# Patient Record
Sex: MALE | Race: WHITE | Employment: FULL TIME | URBAN - METROPOLITAN AREA
[De-identification: names, ages, dates, MRNs, and addresses within clinical notes are randomized per-mention and may not be internally consistent; named-entity substitution may affect disease eponyms.]

---

## 2019-06-30 ENCOUNTER — HOSPITAL ENCOUNTER (EMERGENCY)
Facility: CLINIC | Age: 34
Discharge: HOME | End: 2019-06-30
Attending: EMERGENCY MEDICINE | Admitting: EMERGENCY MEDICINE
Payer: OTHER MISCELLANEOUS

## 2019-06-30 ENCOUNTER — APPOINTMENT (OUTPATIENT)
Dept: CT IMAGING | Facility: CLINIC | Age: 34
End: 2019-06-30
Attending: EMERGENCY MEDICINE
Payer: OTHER MISCELLANEOUS

## 2019-06-30 VITALS
BODY MASS INDEX: 35.94 KG/M2 | RESPIRATION RATE: 18 BRPM | OXYGEN SATURATION: 95 % | TEMPERATURE: 98.8 F | HEIGHT: 74 IN | DIASTOLIC BLOOD PRESSURE: 102 MMHG | WEIGHT: 280 LBS | SYSTOLIC BLOOD PRESSURE: 150 MMHG
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
## 2019-06-30 DIAGNOSIS — S06.0X0A CONCUSSION WITHOUT LOSS OF CONSCIOUSNESS, INITIAL ENCOUNTER: ICD-10-CM

## 2019-06-30 DIAGNOSIS — R51.9 NONINTRACTABLE HEADACHE, UNSPECIFIED CHRONICITY PATTERN, UNSPECIFIED HEADACHE TYPE: ICD-10-CM

## 2019-06-30 PROCEDURE — 99284 EMERGENCY DEPT VISIT MOD MDM: CPT | Mod: 25

## 2019-06-30 PROCEDURE — 70450 CT HEAD/BRAIN W/O DYE: CPT

## 2019-06-30 PROCEDURE — 70486 CT MAXILLOFACIAL W/O DYE: CPT

## 2019-06-30 RX ORDER — IBUPROFEN 600 MG/1
600 TABLET, FILM COATED ORAL
Status: DISCONTINUED | OUTPATIENT
Start: 2019-06-30 | End: 2019-06-30 | Stop reason: HOSPADM

## 2019-06-30 RX ORDER — ACETAMINOPHEN 325 MG/1
650 TABLET ORAL EVERY 6 HOURS PRN
Qty: 20 TABLET | Refills: 0 | Status: SHIPPED | OUTPATIENT
Start: 2019-06-30 | End: 2021-05-21

## 2019-06-30 RX ORDER — IBUPROFEN 600 MG/1
600 TABLET, FILM COATED ORAL EVERY 6 HOURS PRN
Qty: 30 TABLET | Refills: 0 | Status: SHIPPED | OUTPATIENT
Start: 2019-06-30 | End: 2021-05-21

## 2019-06-30 ASSESSMENT — ENCOUNTER SYMPTOMS
NECK PAIN: 0
HEADACHES: 1
VOMITING: 0
EYE PAIN: 1
NAUSEA: 0

## 2019-06-30 ASSESSMENT — MIFFLIN-ST. JEOR: SCORE: 2289.82

## 2019-06-30 NOTE — ED AVS SNAPSHOT
[facility] Emergency Department  [address]  [facility] [address]  Phone:  [phone]  Fax:  [phone]                                    Deshawn Pena   MRN: [id number]    Department:  [facility] Emergency Department   Date of Visit:  6/30/2019           After Visit Summary Signature Page    I have received my discharge instructions, and my questions have been answered. I have discussed any challenges I see with this plan with the nurse or doctor.    ..........................................................................................................................................  Patient/Patient Representative Signature      ..........................................................................................................................................  Patient Representative Print Name and Relationship to Patient    ..................................................               ................................................  Date                                   Time    ..........................................................................................................................................  Reviewed by Signature/Title    ...................................................              ..............................................  Date                                               Time          22EPIC Rev 08/18

## 2019-06-30 NOTE — DISCHARGE INSTRUCTIONS
Discharge Instructions  Concussion    You were seen today for signs of a concussion.  The symptoms will vary, depending on the nature of your injury and your health. You may have: headache, confusion, nausea (feel sick to your stomach), vomiting (throwing up) and problems with memory, concentrating, or sleep. You may feel dizzy, irritable, and tired. Children and teens may need help from their parents, teachers, and coaches to watch for symptoms as they recover.    Generally, every Emergency Department visit should have a follow-up clinic visit with either a primary or a specialty clinic/provider. Please follow-up as instructed by your emergency provider today.     Return to the Emergency Department if:  Your headache gets worse or you start to have a really bad headache even with the recommended treatment plan.   You feel drowsier, have growing confusion, or slurred speech.   You keep repeating yourself.   You have strange behavior or are feeling more irritable.   You have a seizure.   You vomit (throw up) more than once.   You have trouble walking.   You have weakness or numbness.  Your neck pain gets worse.   You have a loss of consciousness.   You have blood for fluid coming from your ears or nose.   You have new symptoms or anything that worries you.     Home Care:  Get lots of rest and get enough sleep at night. Take daytime naps or rest if you feel tired.   Limit physical activity and  thinking  activities. These can make symptoms worse.   Physical activities include gym, sports, weight training, running, exercise, and heavy lifting.   Thinking activities include homework, class work, job-related work, and screen time (phone, computer, tablet, TV, and video games).   Stick to a healthy diet and drink lots of fluids. Avoid alcohol.  As symptoms improve, you may slowly return to your daily activities. If symptoms get worse or return, reduce your activity.   Know that it is normal to feel sad or frustrated when  you do not feel right and are less active.     Going Back to Work:  Your care team will tell you when you are ready to return to work.    Limit the amount of work you do soon after your injury. This may speed healing. Take breaks if your symptoms get worse. You should also reduce your physical activity as well as activities that require a lot of thinking until you see your doctor. You may need shorter work days and a lighter workload.  Avoid heavy lifting, working with machinery, driving and working at heights until your symptoms are gone or you are cleared by a provider.    Going Back to School:  If you are still having symptoms, you may need extra help at school.  Tell your teachers and school nurse about your injury and symptoms. Ask them to watch for problems with learning, memory, and concentrating. Symptoms may get worse when you do schoolwork, and you may become more irritable. You may need shorter school days, a reduced workload, and to postpone testing.  Do not drive or take gym class (physical activity) until cleared by a provider.    Returning to Sports:  Never return to play if you have any symptoms. A full recovery will reduce the chances of getting hurt again. Remember, it is better to miss one or two games than a whole season.  You should rest from all physical activity until you see your provider. Generally, if all symptoms have completely cleared, your provider can help guide you to slowly return to sports. If symptoms return or worsen, stop the activity and see your provider.  Important: If you are in an organized sport and under age 18, you will need written consent from a healthcare provider before you return to sports. Typically, this will be your primary care or sports medicine provider. Please make an appointment.    If you were given a prescription for medicine here today, be sure to read all of the information (including the package insert) that comes with your prescription.  This will  include important information about the medicine, its side effects, and any warnings that you need to know about.  The pharmacist who fills the prescription can provide more information and answer questions you may have about the medicine.  If you have questions or concerns that the pharmacist cannot address, please call or return to the Emergency Department.     Remember that you can always come back to the Emergency Department if you are not able to see your regular provider in the amount of time listed above, if you get any new symptoms, or if there is anything that worries you.
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
## 2019-07-01 ENCOUNTER — OFFICE VISIT (OUTPATIENT)
Dept: VASCULAR SURGERY | Facility: CLINIC | Age: 34
End: 2019-07-01
Payer: COMMERCIAL

## 2019-07-01 DIAGNOSIS — Z53.9 ERRONEOUS ENCOUNTER--DISREGARD: Primary | ICD-10-CM

## 2019-07-01 PROCEDURE — 99202 OFFICE O/P NEW SF 15 MIN: CPT | Performed by: SURGERY

## 2019-07-01 NOTE — PROGRESS NOTES
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  These have progressively increased and now involve both anterior medial and anterior lateral proximal one third of the calf with very large varicosities.  His job requires that he standing most of his shift and these have been progressively sore though he has had no history of phlebitis, DVT, bleeding, ulcerations.    Father had a history of varicose veins and thus he is worn medical grade knee-high compression stockings almost daily in particular when he is working for the last 8 years.  Even with this the varicosities developed on the right.  He has a few on the left but they are not bothersome or very large.    He did undergo a venous duplex ultrasound when living in [facility] a year ago.  They had recommended surgery.  Apparently the greater saphenous vein was not the cause of the varicosities.  However, before his insurance company approve the surgery he had already moved to the [facility] and thus comes to see us today.      PMH: No allergies.            Several medications as needed including Advil, Zantac, Zofran.            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    Social history:  with children.  Lives in [place].    [facility]: Father with varicose vein issues.    Exam: Very pleasant alert gentleman.  Normal affect.              Height 6 foot 3 inches.  Weight 280 pounds              Chest= clear              +3 dorsalis pedis pulses bilaterally.              U-type upside down pattern varicosities on the medial and lateral proximal right calf underlying  the patella.  Up to 10 mm in diameter.  Very little if any distal edema.  Normal skin.  Normal sensation.  Very few visible varicosities in the left leg.      Impression: Significant very dilated symptomatic right calf varicose veins.  I am somewhat surprised that the outside institution ultrasound revealed a normal greater saphenous vein as I would expect this to be the etiology.  We do need to repeat the duplex ultrasound since it is over 6 months old and we will schedule him for this.  He has been wearing compression stockings for up to 8 years and will continue doing so.  We discussed the various surgical birth procedures depending on ultrasound findings.  If we need to treat the greater saphenous vein complete closure from the groin to the ankle would be performed and he is aware he may have some temporary permanent numbness of the greater saphenous nerve distribution.  We also discussed the surgical procedure with either the VNUS Closure or cosmetic stab phlebectomies or combination thereof.  Discussed the compression protocol and follow-up duplex ultrasound and restrictions returning to work.    Right leg VCSS=7   CEAP:C2      Donovan Yip MD

## 2019-08-05 ENCOUNTER — OFFICE VISIT (OUTPATIENT)
Dept: VASCULAR SURGERY | Facility: CLINIC | Age: 34
End: 2019-08-05
Payer: COMMERCIAL

## 2019-08-05 DIAGNOSIS — Z53.9 ERRONEOUS ENCOUNTER--DISREGARD: Primary | ICD-10-CM

## 2019-08-05 PROCEDURE — 99213 OFFICE O/P EST LOW 20 MIN: CPT | Performed by: SURGERY

## 2019-08-20 ENCOUNTER — APPOINTMENT (OUTPATIENT)
Dept: VASCULAR SURGERY | Facility: CLINIC | Age: 34
End: 2019-08-20
Payer: COMMERCIAL

## 2019-08-20 PROCEDURE — 99207 ZZC VEINSOLUTIONS NO CHARGE VISIT: CPT | Performed by: SURGERY

## 2019-09-04 ENCOUNTER — OFFICE VISIT (OUTPATIENT)
Dept: VASCULAR SURGERY | Facility: CLINIC | Age: 34
End: 2019-09-04

## 2019-09-04 ENCOUNTER — APPOINTMENT (OUTPATIENT)
Dept: VASCULAR SURGERY | Facility: CLINIC | Age: 34
End: 2019-09-04
Payer: COMMERCIAL

## 2019-09-04 DIAGNOSIS — Z53.9 ERRONEOUS ENCOUNTER--DISREGARD: Primary | ICD-10-CM

## 2019-09-04 PROCEDURE — S9999 SALES TAX: HCPCS | Performed by: SURGERY

## 2019-09-04 PROCEDURE — 37765 STAB PHLEB VEINS XTR 10-20: CPT | Performed by: SURGERY

## 2019-09-04 PROCEDURE — 36475 ENDOVENOUS RF 1ST VEIN: CPT | Mod: RT | Performed by: SURGERY

## 2019-09-04 PROCEDURE — 37799 UNLISTED PX VASCULAR SURGERY: CPT | Performed by: SURGERY

## 2019-09-04 NOTE — PROGRESS NOTES
[facility] Operative Report    Preoperative diagnosis:    1.  Incompetent right greater saphenous vein  2.  Painful right calf varicose veins.    Post operative diagnosis:  Same    Procedure:  1.  Radiofrequency ablation entire right greater saphenous vein  2.  Cosmetic stab phlebectomy right calf varicosities.    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  He is found to have incompetence of the greater saphenous vein from the saphenofemoral junction on the mid calf with a diameter of almost 10 mm throughout the thigh.  Deep system is normal.  He is tried compression but has ongoing symptoms.  Darling to benefit from surgical treatment.  With him standing in preinduction with his wife present we marked the surface dilated varicosities.  Risk benefits were reviewed including the possibility of temporary or permanent numbness the greater saphenous nerve.    Procedure: Brought to our procedure room.  The entire right leg and groin were prepped and draped.  Patient is 6 foot 3 inches tall.  He was placed in reverse Trendelenburg and we identified the greater saphenous vein from the junction down to the ankle as a single channel along with a large dilated varicosities in the proximal calf with ultrasound.  Timeout was called and sites were identified.    VNUS: Reaccessed the ankle greater saphenous vein with ultrasound guidance.  This was dilated with 7 Maori sheath.  Closure fast catheter was selected and passed through the sheath to the saphenofemoral junction with no difficulty.  Tip was placed 1.89 cm from the junction.  Placed in Trendelenburg where the position of  the tip was documented.  Under ultrasound guidance the tumescent solution was injected circumferentially around the catheter from the proximal calf to the groin and then from the ankle to the knee.  At least 1 cm of tumescence solution was noted between the catheter and the dermis.  We then began our treatment sessions.  This was done for 20 sec intervals at 120  C.  The first segment was treated with 3 sessions.  The rest of the segments to the proximal calf were treated with 2 segments to the large diameter with good visualization effect with ultrasound compression and no discomfort.  The rest of the calf segments were treated with a single session.  Sheath and catheter removed and pressure was held.  5-0 chromic was placed at the ankle site.    Stab phlebectomy: We then anesthetized the previously marked sites on his calf for the surface varicosities.  With a micro-ophthalmic blade we made 36 stepladder incisions.  With a vein hook remove these very enlarged varicose vein branches their entirety with minimal blood loss.    Vaseline ointment was placed over all sites followed by ABD pads cast rolls and Ace bandage from the toes to the thigh.    CECIL Godinez monitored blood pressure, pulse and pulse oximetry continuously throughout the procedure.  This was done under my direct supervision and stable during the entire procedure.    Patient recovered and discharged home with his wife with postoperative instructions and follow-up.    VNUS Procedure:    Pharmacologic agents:   Local anesthesia:   10 mL Lidocaine 1% with Epinephrine + 1 mL Sodium Bicarbonate 8.4%    Total injected volume: 4 mL    Tumescent Anesthesia: 500 ml bag 0.9% Sodium Chloride +60 ml 1% lidocaine with epi 1:100,000 +12 ml 8.4% Sodium Bicarbonate (total volume: 572ml per bag)  Total injected volume: 522 mL    Use of ultrasound guidance:  Yes  Start time: 0802 hrs.  End time: 0909 hrs.  Catheter insertion site: Right ankle greater  saphenous vein  Length of vein treated: 89.5 cm  Treatment with 24 RF cycles for 8 minutes and 0 seconds     36 stab phlebectomy sites  EBL= 20 mL      Donovan Yip MD   Dictated 9/4/2019

## 2019-09-06 ENCOUNTER — APPOINTMENT (OUTPATIENT)
Dept: VASCULAR SURGERY | Facility: CLINIC | Age: 34
End: 2019-09-06
Payer: COMMERCIAL

## 2019-09-06 PROCEDURE — 93971 EXTREMITY STUDY: CPT | Performed by: SURGERY

## 2019-09-06 PROCEDURE — 99207 ZZC VEINSOLUTIONS NO CHARGE VISIT: CPT | Performed by: SURGERY

## 2019-10-14 ENCOUNTER — OFFICE VISIT (OUTPATIENT)
Dept: VASCULAR SURGERY | Facility: CLINIC | Age: 34
End: 2019-10-14
Payer: COMMERCIAL

## 2019-10-14 DIAGNOSIS — I83.812 VARICOSE VEINS OF LEFT LOWER EXTREMITY WITH PAIN: Primary | ICD-10-CM

## 2019-10-14 PROCEDURE — 99207 ZZC VEINSOLUTIONS POST OPERATIVE VISIT: CPT | Performed by: SURGERY

## 2019-10-14 NOTE — PROGRESS NOTES
[facility]: Jess Pena returns for 6-week follow-up.  He underwent ablation the entire left greater saphenous vein along with right calf stab phlebectomies on 9/4/2019.  Follow-up 72-hour duplex revealed appropriate occlusion of the greater saphenous vein 8.3 mm from the junction.    He did have some mild numbness along the greater saphenous distribution from the mid calf initially and this has improved to involving only the ankle distally.  He said no discomfort otherwise in his leg pain is markedly improved from preoperatively.    At work he typically wears knee-high compression stocking on both legs even after the surgery.    Exam: Doing very well.              No palpable cords.              No distal edema.              No visible remaining varicosities              Stab phlebectomy sites are healing very nicely              Very mild ankle numbness.    No significant left leg varicosities.  No swelling.    IImpression: Doing very well following venous surgery in the right leg.  Discussed the numbness situation with him again today and fortunately is improving.  He still wears the knee-high compression stockings.  I did give him information on knee-high athletic CEP stockings that he may want to use instead of a standard compression stocking.  We will see him in 1 year for a follow-up duplex ultrasound.    Donovan Yip MD     Dictated 10/14/2019

## 2020-03-25 DIAGNOSIS — I83.811 VARICOSE VEINS OF RIGHT LOWER EXTREMITY WITH PAIN: Primary | ICD-10-CM

## 2020-08-26 ENCOUNTER — HOSPITAL ENCOUNTER (OUTPATIENT)
Dept: LAB | Facility: CLINIC | Age: 35
Discharge: HOME | End: 2020-08-26
Attending: EMERGENCY MEDICINE | Admitting: EMERGENCY MEDICINE
Payer: COMMERCIAL

## 2020-08-26 ENCOUNTER — NURSE TRIAGE (OUTPATIENT)
Dept: NURSING | Facility: CLINIC | Age: 35
End: 2020-08-26

## 2020-08-26 DIAGNOSIS — Z20.822 EXPOSURE TO COVID-19 VIRUS: Primary | ICD-10-CM

## 2020-08-26 DIAGNOSIS — Z20.822 EXPOSURE TO COVID-19 VIRUS: ICD-10-CM

## 2020-08-26 PROCEDURE — 36415 COLL VENOUS BLD VENIPUNCTURE: CPT | Performed by: EMERGENCY MEDICINE

## 2020-08-26 PROCEDURE — 86769 SARS-COV-2 COVID-19 ANTIBODY: CPT | Performed by: EMERGENCY MEDICINE

## 2020-08-26 NOTE — TELEPHONE ENCOUNTER
"Patient is calling requesting COVID serologic antibody testing.  NOTE: Serologic testing is a blood test for 'antibodies' which are made at 10-14 days after you have had symptoms of COVID or were exposed and had an asymptomatic infection.  This does NOT test you for 'active' infection or tell you if you are contagious.    Are you a healthcare worker? yes  Do you currently have a cough, fever, body aches, shortness of breath, or difficulty breathing?  No  Did you previously have cough, fever, body aches, shortness of breath, or difficulty breathing that have now resolved? No previous covid symptoms.   Have you been exposed to (or come into close contact with) someone who tested POSITIVE for COVID-19 or someone who had a possible case of COVID-19?  Possible exposure 48 days ago.  Possible exposure > 14 days ago.  Lab order placed per SARS-CoV-2 Serology test Standing Order using indication \"Possible exposure >14 days ago\" and diagnosis code  \"Exposure to COVID-19 Virus\" (Z20.828)      The patient was informed: \"Testing is limited each day and it may take time for testing to be available to everyone who has called. You will receive a call within 48-72 hours to schedule the serology testing. Please confirm the best number to reach you is [phone]. If you have any questions about scheduling, call [phone].\"     Radha Haywood RN on 8/26/2020 at 10:31 AM    "

## 2020-08-27 LAB
COVID-19 SPIKE RBD ABY TITER: NORMAL
COVID-19 SPIKE RBD ABY: NEGATIVE

## 2020-09-01 PROBLEM — Z76.89 HEALTH CARE HOME: Status: ACTIVE | Noted: 2020-09-01

## 2020-09-01 PROBLEM — Z71.89 ACP (ADVANCE CARE PLANNING): Status: ACTIVE | Noted: 2020-09-01

## 2020-09-02 ENCOUNTER — OFFICE VISIT (OUTPATIENT)
Dept: FAMILY MEDICINE | Facility: CLINIC | Age: 35
End: 2020-09-02

## 2020-09-02 VITALS
RESPIRATION RATE: 20 BRPM | SYSTOLIC BLOOD PRESSURE: 118 MMHG | WEIGHT: 284 LBS | HEIGHT: 75 IN | OXYGEN SATURATION: 97 % | BODY MASS INDEX: 35.31 KG/M2 | HEART RATE: 73 BPM | TEMPERATURE: 98.1 F | DIASTOLIC BLOOD PRESSURE: 94 MMHG

## 2020-09-02 DIAGNOSIS — R11.2 NAUSEA AND VOMITING, INTRACTABILITY OF VOMITING NOT SPECIFIED, UNSPECIFIED VOMITING TYPE: ICD-10-CM

## 2020-09-02 DIAGNOSIS — Z00.00 ROUTINE HISTORY AND PHYSICAL EXAMINATION OF ADULT: Primary | ICD-10-CM

## 2020-09-02 DIAGNOSIS — Z71.89 ACP (ADVANCE CARE PLANNING): ICD-10-CM

## 2020-09-02 DIAGNOSIS — R21 RASH AND NONSPECIFIC SKIN ERUPTION: ICD-10-CM

## 2020-09-02 DIAGNOSIS — Z76.89 HEALTH CARE HOME: ICD-10-CM

## 2020-09-02 DIAGNOSIS — G47.26 SHIFT WORK SLEEP DISORDER: ICD-10-CM

## 2020-09-02 LAB
BUN SERPL-MCNC: 14 MG/DL (ref 7–25)
BUN/CREATININE RATIO: 15.9 (ref 6–22)
CALCIUM SERPL-MCNC: 9.3 MG/DL (ref 8.6–10.3)
CHLORIDE SERPLBLD-SCNC: 105.3 MMOL/L (ref 98–110)
CHOLEST SERPL-MCNC: 157 MG/DL (ref 0–199)
CHOLEST/HDLC SERPL: 4 {RATIO} (ref 0–5)
CO2 SERPL-SCNC: 31.1 MMOL/L (ref 20–32)
CREAT SERPL-MCNC: 0.88 MG/DL (ref 0.7–1.18)
GLUCOSE SERPL-MCNC: 92 MG/DL (ref 60–99)
HDLC SERPL-MCNC: 39 MG/DL (ref 40–150)
HEMOGLOBIN: 14.3 G/DL (ref 13.3–17.7)
LDLC SERPL CALC-MCNC: 99 MG/DL (ref 0–130)
POTASSIUM SERPL-SCNC: 4.39 MMOL/L (ref 3.5–5.3)
SODIUM SERPL-SCNC: 142.2 MMOL/L (ref 135–146)
TRIGL SERPL-MCNC: 97 MG/DL (ref 0–149)

## 2020-09-02 PROCEDURE — 36415 COLL VENOUS BLD VENIPUNCTURE: CPT | Performed by: FAMILY MEDICINE

## 2020-09-02 PROCEDURE — 80048 BASIC METABOLIC PNL TOTAL CA: CPT | Performed by: FAMILY MEDICINE

## 2020-09-02 PROCEDURE — 85018 HEMOGLOBIN: CPT | Performed by: FAMILY MEDICINE

## 2020-09-02 PROCEDURE — 99395 PREV VISIT EST AGE 18-39: CPT | Performed by: FAMILY MEDICINE

## 2020-09-02 PROCEDURE — 80061 LIPID PANEL: CPT | Performed by: FAMILY MEDICINE

## 2020-09-02 RX ORDER — ONDANSETRON 4 MG/1
TABLET, ORALLY DISINTEGRATING ORAL
COMMUNITY
Start: 2017-09-27 | End: 2020-09-02

## 2020-09-02 RX ORDER — PREDNISONE 20 MG/1
TABLET ORAL
Qty: 10 TABLET | Refills: 0 | Status: SHIPPED | OUTPATIENT
Start: 2020-09-02 | End: 2021-05-21

## 2020-09-02 RX ORDER — ARMODAFINIL 250 MG/1
250 TABLET ORAL
COMMUNITY
Start: 2019-10-24 | End: 2020-09-02

## 2020-09-02 RX ORDER — ONDANSETRON 4 MG/1
4 TABLET, ORALLY DISINTEGRATING ORAL EVERY 6 HOURS PRN
Qty: 60 TABLET | Refills: 1 | Status: SHIPPED | OUTPATIENT
Start: 2020-09-02 | End: 2021-10-29

## 2020-09-02 RX ORDER — ARMODAFINIL 250 MG/1
250 TABLET ORAL
Qty: 30 TABLET | Refills: 1 | Status: SHIPPED | OUTPATIENT
Start: 2020-09-03 | End: 2021-10-29

## 2020-09-02 SDOH — HEALTH STABILITY: MENTAL HEALTH: HOW MANY STANDARD DRINKS CONTAINING ALCOHOL DO YOU HAVE ON A TYPICAL DAY?: 1 OR 2

## 2020-09-02 SDOH — HEALTH STABILITY: MENTAL HEALTH: HOW OFTEN DO YOU HAVE 6 OR MORE DRINKS ON ONE OCCASION?: NEVER

## 2020-09-02 SDOH — HEALTH STABILITY: MENTAL HEALTH: HOW OFTEN DO YOU HAVE A DRINK CONTAINING ALCOHOL?: MONTHLY OR LESS

## 2020-09-02 ASSESSMENT — ANXIETY QUESTIONNAIRES
GAD7 TOTAL SCORE: 0
7. FEELING AFRAID AS IF SOMETHING AWFUL MIGHT HAPPEN: NOT AT ALL
3. WORRYING TOO MUCH ABOUT DIFFERENT THINGS: NOT AT ALL
1. FEELING NERVOUS, ANXIOUS, OR ON EDGE: NOT AT ALL
2. NOT BEING ABLE TO STOP OR CONTROL WORRYING: NOT AT ALL
6. BECOMING EASILY ANNOYED OR IRRITABLE: NOT AT ALL
5. BEING SO RESTLESS THAT IT IS HARD TO SIT STILL: NOT AT ALL

## 2020-09-02 ASSESSMENT — MIFFLIN-ST. JEOR: SCORE: 2310.91

## 2020-09-02 ASSESSMENT — PATIENT HEALTH QUESTIONNAIRE - PHQ9
SUM OF ALL RESPONSES TO PHQ QUESTIONS 1-9: 0
5. POOR APPETITE OR OVEREATING: NOT AT ALL

## 2020-09-02 NOTE — PROGRESS NOTES
SUBJECTIVE:    CC: Deshawn Pena is a 35 year old male who presents for physical    HPI: over all doing well  Has rash on leg due to bug bites    HISTORIES:    PROBLEM LIST:                   Patient Active Problem List   Diagnosis     ACP (advance care planning)     Health Care Home       PAST MEDICAL HISTORY:                No past medical history on file.    PAST SURGICAL HISTORY:                No past surgical history on file.    CURRENT MEDICATIONS:                  Current Outpatient Medications   Medication Sig Dispense Refill     acetaminophen (TYLENOL) 325 MG tablet Take 2 tablets (650 mg) by mouth every 6 hours as needed for mild pain 20 tablet 0     [START ON 9/3/2020] armodafinil (NUVIGIL) 250 MG TABS tablet Take 1 tablet (250 mg) by mouth twice a week 30 tablet 1     ibuprofen (ADVIL/MOTRIN) 600 MG tablet Take 1 tablet (600 mg) by mouth every 6 hours as needed for moderate pain 30 tablet 0     ondansetron (ZOFRAN-ODT) 4 MG ODT tab Take 1 tablet (4 mg) by mouth every 6 hours as needed for nausea 60 tablet 1     predniSONE (DELTASONE) 20 MG tablet 2 daily for 3 days then 1 daily for 4 days 10 tablet 0       ALLERGIES:                No Known Allergies    SOCIAL HISTORY:                  Social History     Socioeconomic History     Marital status:      Spouse name: Not on file     Number of children: Not on file     Years of education: Not on file     Highest education level: Not on file   Occupational History     Not on file   Social Needs     Financial resource strain: Not on file     Food insecurity     Worry: Not on file     Inability: Not on file     Transportation needs     Medical: Not on file     Non-medical: Not on file   Tobacco Use     Smoking status: Never Smoker     Smokeless tobacco: Never Used   Substance and Sexual Activity     Alcohol use: Yes     Frequency: Monthly or less     Drinks per session: 1 or 2     Binge frequency: Never     Drug use: Not on file     Sexual activity:  "Not on file   Lifestyle     Physical activity     Days per week: Not on file     Minutes per session: Not on file     Stress: Not on file   Relationships     Social connections     Talks on phone: Not on file     Gets together: Not on file     Attends Orthodox service: Not on file     Active member of club or organization: Not on file     Attends meetings of clubs or organizations: Not on file     Relationship status: Not on file     Intimate partner violence     Fear of current or ex partner: Not on file     Emotionally abused: Not on file     Physically abused: Not on file     Forced sexual activity: Not on file   Other Topics Concern     Not on file   Social History Narrative     Not on file       FAMILY HISTORY:                   Family History   Problem Relation Age of Onset     Hypertension Mother      Diabetes Type 2  Father      Hypertension Father      Heart Disease Paternal Grandmother        HEALTH MAINTENANCE:                    REVIEW OF OUTSIDE RECORDS: NO    REVIEW OF SYSTEMS:  CONSTITUTIONAL: NEGATIVE for fever, chills  EYES: NEGATIVE for vision changes   RESP: NEGATIVE for significant cough or SOB  CV: NEGATIVE for chest pain, palpitations   GI: NEGATIVE for nausea, abdominal pain, heartburn, or change in bowel habits  : NEGATIVE for frequency, dysuria, or hematuria  MUSCULOSKELETAL: NEGATIVE for significant arthralgias or myalgia  NEURO: NEGATIVE for weakness, dizziness or paresthesias or headache  ENDOCRINE: NEGATIVE for temperature intolerance, skin/hair changes  HEME: NEGATIVE for bleeding problems  PSYCHIATRIC: NEGATIVE for changes in mood or affect    EXAM:    BP (!) 118/94 (BP Location: Right arm, Patient Position: Sitting, Cuff Size: Adult Large)   Pulse 73   Temp 98.1  F (36.7  C) (Oral)   Resp 20   Ht 1.892 m (6' 2.5\")   Wt 128.8 kg (284 lb)   SpO2 97%   BMI 35.98 kg/m    GENERAL APPEARANCE: healthy, alert and no distress   EXAM:  GENERAL APPEARANCE: healthy, alert and no " distress  EYES: EOMI,  PERRL  HENT: ear canals and TM's normal and nose and mouth without ulcers or lesions  RESP: lungs clear to auscultation - no rales, rhonchi or wheezes  CV: regular rates and rhythm, normal S1 S2, no S3 or S4 and no murmur, click or rub -  ABDOMEN:  soft, nontender, no HSM or masses and bowel sounds normal  GU_male: testicles normal without atrophy or masses and no hernias  MS: extremities normal- no gross deformities noted, no evidence of inflammation in joints, FROM in all extremities.  SKIN: excoriation - upper legs  PSYCH: mentation appears normal and affect normal/bright  LYMPHATICS: No axillary, cervical, inguinal, or supraclavicular nodes         ASSESSMENT/PLAN  (Z00.00) Routine history and physical examination of adult  (primary encounter diagnosis)  Comment: weight loss and exercise  Plan: HEMOGLOBIN, Basic Metabolic Panel (BFP), Lipid         Panel (BFP)            (Z71.89) ACP (advance care planning)  Comment:   Plan:     (Z76.89) Health Care Home  Comment:   Plan:     (R11.2) Nausea and vomiting, intractability of vomiting not specified, unspecified vomiting type  Comment:   Plan: ondansetron (ZOFRAN-ODT) 4 MG ODT tab            (G47.26) Shift work sleep disorder  Comment:   Plan: armodafinil (NUVIGIL) 250 MG TABS tablet            (R21) Rash and nonspecific skin eruption  Comment:   Plan: predniSONE (DELTASONE) 20 MG tablet                I have discussed with patient the risks, benefits, medications, treatment options and modalities.   I have instructed the patient to call or schedule a follow-up appointment if any problems or failure to improve.

## 2020-09-02 NOTE — NURSING NOTE
Chief Complaint   Patient presents with     Physical     annual, fasting     New Patient     new patient to this clinic     Pre-visit Screening:  Immunizations:  Unknown-do not have records in system but patient states that he had his last 6 years ago   Colonoscopy:  NA  Mammogram: NA  Asthma Action Test/Plan:  NA  PHQ9:  Given today-new pt   GAD7:  Given today-new pt   Questioned patient about current smoking habits Pt. has never smoked.  Ok to leave detailed message on voice mail for today's visit only Yes, phone # [phone]

## 2020-09-02 NOTE — LETTER
[facility]  [address] [place]  [address]  [facility] [address]  [phone]      September 2, 2020      Deshawn Pena  [address]  [facility] [address]      EMERGENCY CARE PLAN  Presenting Problem Treatment Plan   Questions or concerns during clinic hours I will call the clinic directly:    [facility]  [address] , [address]  [place] [address]  [phone]   Questions or concerns outside clinic hours  I will call the 24 hour line at [phone]   Patient needs to schedule an appointment  I will call the  scheduling line at [phone]   Same day treatment   I will call the clinic first, then  urgent care and/or  express care if needed   Clinic Care Coordinators Liliana Garnett RN:  [phone]  [facility] Clinical Support Staff: [phone]    Crisis Services:  Behavioral or Mental Health [facility] ([facility]al [facility] Providers)   [phone]   Emergency treatment--Immediately CALL [phone]

## 2020-09-03 ASSESSMENT — ANXIETY QUESTIONNAIRES: GAD7 TOTAL SCORE: 0

## 2020-10-14 ENCOUNTER — TRANSFERRED RECORDS (OUTPATIENT)
Dept: FAMILY MEDICINE | Facility: CLINIC | Age: 35
End: 2020-10-14

## 2021-01-03 ENCOUNTER — HEALTH MAINTENANCE LETTER (OUTPATIENT)
Age: 36
End: 2021-01-03

## 2021-05-21 ENCOUNTER — OFFICE VISIT (OUTPATIENT)
Dept: FAMILY MEDICINE | Facility: CLINIC | Age: 36
End: 2021-05-21

## 2021-05-21 VITALS
DIASTOLIC BLOOD PRESSURE: 80 MMHG | TEMPERATURE: 97.8 F | HEART RATE: 70 BPM | WEIGHT: 293.4 LBS | HEIGHT: 75 IN | SYSTOLIC BLOOD PRESSURE: 122 MMHG | OXYGEN SATURATION: 97 % | BODY MASS INDEX: 36.48 KG/M2

## 2021-05-21 DIAGNOSIS — L03.116 CELLULITIS OF LEFT LOWER EXTREMITY: Primary | ICD-10-CM

## 2021-05-21 PROCEDURE — 99214 OFFICE O/P EST MOD 30 MIN: CPT | Performed by: FAMILY MEDICINE

## 2021-05-21 RX ORDER — CEPHALEXIN 500 MG/1
500 CAPSULE ORAL 4 TIMES DAILY
Qty: 40 CAPSULE | Refills: 0 | Status: SHIPPED | OUTPATIENT
Start: 2021-05-21 | End: 2021-06-02

## 2021-05-21 ASSESSMENT — MIFFLIN-ST. JEOR: SCORE: 2353.54

## 2021-05-21 NOTE — PATIENT INSTRUCTIONS
"Assessment & Plan   Problem List Items Addressed This Visit     None      Visit Diagnoses     Cellulitis of left lower extremity    -  Primary    Relevant Medications    cephALEXin (KEFLEX) 500 MG capsule         1. Cellulitis of left lower extremity  Treat with keflex, watch closely, followup if changes or worsening.           BMI:   Estimated body mass index is 37.17 kg/m  as calculated from the following:    Height as of this encounter: 1.892 m (6' 2.5\").    Weight as of this encounter: 133.1 kg (293 lb 6.4 oz).   Weight management plan: Discussed healthy diet and exercise guidelines      FUTURE APPOINTMENTS:       - Follow-up visit if any changes or worsening.    No follow-ups on file.    Lisa Rangel MD  [facility]    "

## 2021-05-21 NOTE — NURSING NOTE
Michele is here for 3-4 days ago, left leg, red warm to touch, blisters, fluid when they pop, thinks celluilitis.    Pre-Visit Screening:  Immunizations:UTD  Colonoscopy:NA  Mammogram:Na  Asthma Action Test/Plan:Na  PHQ9:Na  GAD7:Na  Questioned patient about current smoking habits Pt.never smoker  OK to leave a detailed message on voice mail for today's visit yes, phone # [phone]

## 2021-05-21 NOTE — PROGRESS NOTES
"Assessment & Plan   Problem List Items Addressed This Visit     None      Visit Diagnoses     Cellulitis of left lower extremity    -  Primary    Relevant Medications    cephALEXin (KEFLEX) 500 MG capsule         1. Cellulitis of left lower extremity  Treat with keflex, watch closely, followup if changes or worsening.           BMI:   Estimated body mass index is 37.17 kg/m  as calculated from the following:    Height as of this encounter: 1.892 m (6' 2.5\").    Weight as of this encounter: 133.1 kg (293 lb 6.4 oz).   Weight management plan: Discussed healthy diet and exercise guidelines      FUTURE APPOINTMENTS:       - Follow-up visit if any changes or worsening.    No follow-ups on file.    Lisa Rangel MD  [facility]    Subjective     Nursing Notes:   Irene De Luna, [facility]  5/21/2021  7:51 AM  Signed  Micheel is here for 3-4 days ago, left leg, red warm to touch, blisters, fluid when they pop, thinks celluilitis.    Pre-Visit Screening:  Immunizations:UTD  Colonoscopy:NA  Mammogram:Na  Asthma Action Test/Plan:Na  PHQ9:Na  GAD7:Na  Questioned patient about current smoking habits Pt.never smoker  OK to leave a detailed message on voice mail for today's visit yes, phone # [phone]           Deshawn Pena is a 35 year old male who presents to clinic today for the following health issues  HPI     Blisters and redness, no fever, left thigh for 3-4 days, worsening. Concern for cellulitis, has had this a year ago.    Review of Systems   Constitutional, HEENT, cardiovascular, pulmonary, gi and gu systems are negative, except as otherwise noted.      Objective    /80 (BP Location: Right arm, Patient Position: Sitting, Cuff Size: Adult Large)   Pulse 70   Temp 97.8  F (36.6  C) (Oral)   Ht 1.892 m (6' 2.5\")   Wt 133.1 kg (293 lb 6.4 oz)   SpO2 97%   BMI 37.17 kg/m    Body mass index is 37.17 kg/m .  Physical Exam   GENERAL: healthy, alert and no distress  MS: no gross " musculoskeletal defects noted, no edema  NEURO: Normal strength and tone, mentation intact and speech normal  PSYCH: mentation appears normal, affect normal/bright  Left anterior thigh few cm area of redness, slight increased warmth, central lesion with no vesicle, slightly raised redness/swelling

## 2021-06-01 ENCOUNTER — MYC MEDICAL ADVICE (OUTPATIENT)
Dept: FAMILY MEDICINE | Facility: CLINIC | Age: 36
End: 2021-06-01

## 2021-06-01 DIAGNOSIS — L03.116 CELLULITIS OF LEFT LOWER EXTREMITY: ICD-10-CM

## 2021-06-02 RX ORDER — CEPHALEXIN 500 MG/1
500 CAPSULE ORAL 4 TIMES DAILY
Qty: 40 CAPSULE | Refills: 0 | Status: SHIPPED | OUTPATIENT
Start: 2021-06-02 | End: 2021-06-05

## 2021-08-04 NOTE — PROGRESS NOTES
7:52 AM  Patient comfortable.  US Renal Complete (Comp Urinary System)   Final Result   IMPRESSION:      Unremarkable exam.                 Will treat pyuria with keflex for 10 days.     Xander Ortega MD  08/04/21 0891     [facility]    Deshawn Pena returns today to discuss the right leg venous duplex report that was just completed.  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  He has been wearing a knee-high graduated compression stocking for almost 8 years which only gives him temporary relief.  This affects his normal activities at home and even more at work where his job requires that he standing most of his shift.      Exam: Unchanged from previously.  Very large varicosities primarily in the proximal right medial anterior calf.  No significant distal edema.  No ulcerations.  Good pulses.      I reviewed the venous duplex ultrasound with the patient today.  The deep system is competent except for the common femoral vein which is very common with incompetent greater saphenous vein but the rest of the deep veins are patent with no DVT.  Greater saphenous vein is very dilated incompetent from the saphenofemoral junction down to the ankle.  This measures 9.6 mm in the thigh with a reflux time of 5262 ms.  Measured 7.6 mm at the knee with a reflux time of 9684 ms.  In the mid calf diameter 6.9 again with a significant reflux time of 6747 ms.  Most of the 4 large dominant branches that can measure between 5.9 and 10.1 mm come off the immediate below-knee segment.  Lesser saphenous vein does not communicate with the deep system and is competent though there is some venous branches coming off distally at the ankle.  Accessory saphenous vein is normal.  No incompetent perforators.      Impression: Very symptomatic painful right leg varicose veins continue to be a problem despite aggressive use of compression.  I would recommend that we proceed with complete ablation the entire right greater saphenous vein from the saphenofemoral junction of the ankle along with stab phlebectomies of the very large calf and ankle varicosities.  He  is aware there is a chance of numbness either temporary or even permanent along the greater saphenous nerve distribution explained why we need to treat the calf segment down to the ankle and he understands this.    Procedure would be performed in our office in her late oral sedation with Ativan and clonidine and a tumescent anesthetic.  Compression will be applied postprocedure with a follow-up duplex in 72 hours.  Following this you were thigh-high compression stocking for approximately 10 days.  May gradually increase his work activities.  Very likely long-term calf high graduated compression stocking would be indicated due to his prior venous issues standing requirements.    We spent 20 minutes in the office today with over 50% in counseling.  He and his wife are having  their third child in early fall.  He will try to schedule this before or after the delivery.      Donovan Yip MD     Dictated 8/5/2019

## 2021-09-30 ENCOUNTER — VIRTUAL VISIT (OUTPATIENT)
Dept: UROLOGY | Facility: CLINIC | Age: 36
End: 2021-09-30
Payer: COMMERCIAL

## 2021-09-30 DIAGNOSIS — Z30.09 VASECTOMY EVALUATION: Primary | ICD-10-CM

## 2021-09-30 PROCEDURE — 99203 OFFICE O/P NEW LOW 30 MIN: CPT | Mod: 95 | Performed by: UROLOGY

## 2021-09-30 NOTE — PATIENT INSTRUCTIONS
[facility]  [address]  [place] [address]     Your Vasectomy is scheduled 11/9/2021 ARRIVE 9:35 for 9:45 procedure.  Please call [phone] if you need to reschedule this appointment or if you have any questions.     Preparation for Vasectomy:  Shave the hair away from the base of your penis and around your testicles.  Wear snug underwear the day of the vasectomy to support your testicles.  Do not take aspirin, ibuprofen, advil, motrin, aleve products one week prior to your vasectomy.        General Vasectomy Information    Vasectomy is a surgery.  If it is successful, it will be impossible for you to ever father children.  The following information regards the male sterilization done by an operation called a vasectomy.  This is done in the physician's office.    The operation done to sterilize the male is easier, safer and much less expensive than the operation done to sterilize the woman.    Sterilization should be considered permanent.  For most males, once the operation is done, it can never me undone.  There are attempts made occasionally to reconnect the tubes that have been cut during the procedure, but this is very difficult and expensive and works only about 50-70% of the time.  In order for any of the physicians in our clinic to do a vasectomy, we require that you consider this a permanent form a sterilization.    A vasectomy can be done at any time, but it is best to think about having it done when you can take at least one day off from work and any excess activities.    Your decision to have a vasectomy should only be made with the following facts clear in mind.    1. First, a vasectomy is only one of several means of birth control.  Many form of temporary contraception are available.  If you have any questions about other methods, please discuss this with your physician.    2. A vasectomy may be unsuccessful in approximately one out of 1000 couples per year.  This occurs when the  tubes which are cut during the procedure reconnect themselves.  Sterility cannot be guaranteed.    3. You should be aware that it is the current belief of the medical profession that a vasectomy procedure does not alter a male physically, physiologically or sexually.  Because each person is a unique individual, there is always the possibility of an adverse phychiatric reaction.  This can be best avoided by being very comfortable in your own mind that you want to have this done, and that you do not want to father any children in the future.  If this is not clear in your mind, this should be further discussed with your physician.    4. You will not notice a change in the volume of your ejaculate since sperm is a very small amount of the semen and it is only the sperm that is stopped from entering the ejaculate after a vasectomy.  Your prostate and seminal vesicle glands really supply most of the semen and this is not at all decreased after a vasectomy.  Also there is no effect on the male hormones.    5. You do not become sterile immediately following a vasectomy due to the fact that there is still sperm remaining in your system that must be eliminated by ejaculation.  For this reason, your sexual partner could still become pregnant for a period of time following the vasectomy operation.  It is necessary that contraceptive measures be used until you receive confirmation from your physician that you are sterile.  It takes approximately 12 ejaculations to clear the semen of sperm, but this can differ in different men.  For this reason, it is very important that your semen be checked for sperm before you are considered sterile, and this should be done approximately 12 weeks after your vasectomy.      6. Vasectomy has risks and benefits.  Among the risks are possible complications resulting from the procedure.  These risks include but are not limited to:   A.  Bleeding, infection, or hematoma occuring during or in the  recovery period   from the procedure.   B.  Sperm granuloma or a small pea to walnut sized lump which is a collection of   scar tissue and sperm in your scrotal sack and remains permanently   C.  There may be an increased risk of prostate cancer although the data is   unclear.

## 2021-09-30 NOTE — PROGRESS NOTES
Michele is a 36 year old who is being evaluated via a billable video visit.      How would you like to obtain your AVS? MyChart  If the video visit is dropped, the invitation should be resent by: [phone]  Will anyone else be joining your video visit? No      Video Start Time: 815    Assessment & Plan   Problem List Items Addressed This Visit     None      Visit Diagnoses     Vasectomy evaluation    -  Primary             30 minutes spent on the date of the encounter doing chart review, history and exam, documentation and further activities per the note       See Patient Instructions    No follow-ups on file.    Mitch Leonard MD  [facility] [place]    Subjective   Michele is a 36 year old who presents for the following health issues vasectomy evaluation    HPI     Patient is a 36-year-old male who is interested in having vasectomy done.  He has 3 children.    Review of Systems   Constitutional, HEENT, cardiovascular, pulmonary, gi and gu systems are negative, except as otherwise noted.      Objective           Vitals:  No vitals were obtained today due to virtual visit.    Physical Exam   GENERAL: Healthy, alert and no distress  EYES: Eyes grossly normal to inspection.  No discharge or erythema, or obvious scleral/conjunctival abnormalities.  RESP: No audible wheeze, cough, or visible cyanosis.  No visible retractions or increased work of breathing.    SKIN: Visible skin clear. No significant rash, abnormal pigmentation or lesions.  NEURO: Cranial nerves grossly intact.  Mentation and speech appropriate for age.  PSYCH: Mentation appears normal, affect normal/bright, judgement and insight intact, normal speech and appearance well-groomed.    Vasectomy discussed.  Risks of bleeding, infection, failure rate, chronic testicular pain discussed.  Patient understands need for protection is until negative semen tests.  We will schedule patient for vasectomy.            Video-Visit Details    Type of service:   Video Visit    Video End Time:830    Originating Location (pt. Location): Home    Distant Location (provider location):  [facility]     Platform used for Video Visit: [facility]

## 2021-10-10 ENCOUNTER — HEALTH MAINTENANCE LETTER (OUTPATIENT)
Age: 36
End: 2021-10-10

## 2021-10-29 ENCOUNTER — OFFICE VISIT (OUTPATIENT)
Dept: FAMILY MEDICINE | Facility: CLINIC | Age: 36
End: 2021-10-29

## 2021-10-29 VITALS
RESPIRATION RATE: 16 BRPM | BODY MASS INDEX: 36.36 KG/M2 | HEART RATE: 74 BPM | TEMPERATURE: 98.1 F | SYSTOLIC BLOOD PRESSURE: 126 MMHG | DIASTOLIC BLOOD PRESSURE: 84 MMHG | OXYGEN SATURATION: 96 % | WEIGHT: 287 LBS

## 2021-10-29 DIAGNOSIS — G47.26 SHIFT WORK SLEEP DISORDER: ICD-10-CM

## 2021-10-29 DIAGNOSIS — R11.2 NAUSEA AND VOMITING, INTRACTABILITY OF VOMITING NOT SPECIFIED, UNSPECIFIED VOMITING TYPE: ICD-10-CM

## 2021-10-29 DIAGNOSIS — E66.812 CLASS 2 OBESITY WITHOUT SERIOUS COMORBIDITY IN ADULT, UNSPECIFIED BMI, UNSPECIFIED OBESITY TYPE: ICD-10-CM

## 2021-10-29 DIAGNOSIS — Z00.00 ROUTINE GENERAL MEDICAL EXAMINATION AT A HEALTH CARE FACILITY: Primary | ICD-10-CM

## 2021-10-29 LAB
CHOLEST SERPL-MCNC: 165 MG/DL (ref 0–199)
CHOLEST/HDLC SERPL: 4 {RATIO} (ref 0–5)
GLUCOSE SERPL-MCNC: 95 MG/DL (ref 60–99)
HDLC SERPL-MCNC: 40 MG/DL (ref 40–150)
LDLC SERPL CALC-MCNC: 102 MG/DL (ref 0–130)
TRIGL SERPL-MCNC: 116 MG/DL (ref 0–149)

## 2021-10-29 PROCEDURE — 80061 LIPID PANEL: CPT | Performed by: STUDENT IN AN ORGANIZED HEALTH CARE EDUCATION/TRAINING PROGRAM

## 2021-10-29 PROCEDURE — 99395 PREV VISIT EST AGE 18-39: CPT | Performed by: STUDENT IN AN ORGANIZED HEALTH CARE EDUCATION/TRAINING PROGRAM

## 2021-10-29 PROCEDURE — 36415 COLL VENOUS BLD VENIPUNCTURE: CPT | Performed by: STUDENT IN AN ORGANIZED HEALTH CARE EDUCATION/TRAINING PROGRAM

## 2021-10-29 PROCEDURE — 82947 ASSAY GLUCOSE BLOOD QUANT: CPT | Performed by: STUDENT IN AN ORGANIZED HEALTH CARE EDUCATION/TRAINING PROGRAM

## 2021-10-29 RX ORDER — ONDANSETRON 4 MG/1
4 TABLET, ORALLY DISINTEGRATING ORAL EVERY 6 HOURS PRN
Qty: 60 TABLET | Refills: 1 | Status: SHIPPED | OUTPATIENT
Start: 2021-10-29

## 2021-10-29 RX ORDER — ARMODAFINIL 250 MG/1
250 TABLET ORAL
Qty: 30 TABLET | Refills: 1 | Status: SHIPPED | OUTPATIENT
Start: 2021-11-01 | End: 2022-04-18

## 2021-10-29 NOTE — PROGRESS NOTES
SUBJECTIVE:   CC: Deshawn Pena is an 36 year old male who presents for preventative health visit.     Patient has been advised of split billing requirements and indicates understanding: Yes  HPI   General health: good, acknowledges weight   Diet: balanced  Exercise: walks daily  Sleep: works overnights as RN  Mental Health: no concerns     Today's PHQ-2 Score:   PHQ-2 ( 1999 Pfizer) 5/21/2021   Q1: Little interest or pleasure in doing things 0   Q2: Feeling down, depressed or hopeless 0   PHQ-2 Score 0     Do you feel safe in your environment? Yes    Social History     Tobacco Use     Smoking status: Never Smoker     Smokeless tobacco: Never Used   Substance Use Topics     Alcohol use: Yes     Alcohol/week: 1.0 standard drinks     Types: 1 Shots of liquor per week     If you drink alcohol do you typically have >3 drinks per day or >7 drinks per week? Yes    Last PSA: No results found for: PSA     Other concerns: Takes nuvigil for shift work disorder to flip schedules. Zofran for nausea/GERD symptoms.      Reviewed orders with patient. Reviewed health maintenance and updated orders accordingly - Yes  BP Readings from Last 3 Encounters:   10/29/21 126/84   05/21/21 122/80   09/02/20 (!) 118/94    Wt Readings from Last 3 Encounters:   10/29/21 130.2 kg (287 lb)   05/21/21 133.1 kg (293 lb 6.4 oz)   09/02/20 128.8 kg (284 lb)         Reviewed and updated as needed this visit by clinical staff  Tobacco  Allergies  Meds              Reviewed and updated as needed this visit by Provider                    Review of Systems  12 point ROS performed and negative for new concerns except as mentioned above     OBJECTIVE:   /84 (BP Location: Right arm, Patient Position: Sitting, Cuff Size: Adult Large)   Pulse 74   Temp 98.1  F (36.7  C) (Oral)   Resp 16   Wt 130.2 kg (287 lb)   SpO2 96%   BMI 36.36 kg/m      Physical Exam  GENERAL: healthy, alert and no distress  EYES: Eyes grossly normal to inspection,  "PERRL and conjunctivae and sclerae normal  HENT: ear canals and TM's normal, nose and mouth without ulcers or lesions  NECK: no adenopathy, no asymmetry, masses, or scars and thyroid normal to palpation  RESP: lungs clear to auscultation - no rales, rhonchi or wheezes  CV: regular rate and rhythm, normal S1 S2, no S3 or S4, no murmur, click or rub, no peripheral edema and peripheral pulses strong  ABDOMEN: soft, nontender, no hepatosplenomegaly, no masses and bowel sounds normal  MS: no gross musculoskeletal defects noted, no edema  SKIN: no suspicious lesions or rashes  NEURO: Normal strength and tone, mentation intact and speech normal  PSYCH: mentation appears normal, affect normal/bright    Diagnostic Test Results:  Labs reviewed in Epic    ASSESSMENT/PLAN:       ICD-10-CM    1. Routine general medical examination at a health care facility  Z00.00    2. Shift work sleep disorder  G47.26 armodafinil (NUVIGIL) 250 MG TABS tablet     SLEEP EVALUATION & MANAGEMENT REFERRAL - ADULT -   3. Nausea and vomiting, intractability of vomiting not specified, unspecified vomiting type  R11.2 ondansetron (ZOFRAN-ODT) 4 MG ODT tab   4. Class 2 obesity without serious comorbidity in adult, unspecified BMI, unspecified obesity type  E66.9 Lipid Panel (BFP)     Glucose Fasting (BFP)     SLEEP EVALUATION & MANAGEMENT REFERRAL - ADULT -     Patient has been advised of split billing requirements and indicates understanding: Yes     Encouraged sleep eval prior to further nuvigil refills  COUNSELING:   Reviewed preventive health counseling, as reflected in patient instructions       Regular exercise       Healthy diet/nutrition       Vision screening       Hearing screening       Immunizations       Alcohol Use        Advance Care Planning  .  Estimated body mass index is 36.36 kg/m  as calculated from the following:    Height as of 5/21/21: 1.892 m (6' 2.5\").    Weight as of this encounter: 130.2 kg (287 lb).     Weight management " plan: Discussed healthy diet and exercise guidelines    He reports that he has never smoked. He has never used smokeless tobacco.    Michael Calderon MD, [facility]

## 2021-10-29 NOTE — NURSING NOTE
Chief Complaint   Patient presents with     Physical     fasting, no concerns     Pre-visit Screening:  Immunizations:  up to date  Colonoscopy:    Mammogram:   Asthma Action Test/Plan:  NA  PHQ9:  No concerns  GAD7:  No concerns  Questioned patient about current smoking habits Pt. has never smoked.  Ok to leave detailed message on voice mail for today's visit only YES, phone # [phone]

## 2021-11-02 VITALS — HEIGHT: 75 IN | BODY MASS INDEX: 35.68 KG/M2 | WEIGHT: 287 LBS

## 2021-11-02 ASSESSMENT — MIFFLIN-ST. JEOR: SCORE: 2327.45

## 2021-11-02 NOTE — PROGRESS NOTES
Deshawn Pena is a 36 year old who is being evaluated via a billable video visit.      How would you like to obtain your AVS? MyChart  If the video visit is dropped, the invitation should be resent by: Text to cell phone: [phone]  Will anyone else be joining your video visit? No    Are you currently in the state of [place] Yes  Does patient have any form of state insurance?No   Do you have wifi? Yes  Do you have a smart phone/device?Yes  Can you download an ryan on your phone comfortably with out assistance including You Tube? Yes    If patient encounters technical issues they should call [phone] :[phone]}    Lidia Barajas CMA    Video-Visit Details    Video Start Time: 10:07 AM    Type of service:  Video Visit    Video End Time:10:25 AM    Originating Location (pt. Location): Home    Distant Location (provider location):  @[email or web address]@     Platform used for Video Visit: AmWell and Doximety    Additional 15 minutes on the date of service was spent performing the following:    -Preparing to see the patient  -Obtaining and/or reviewing separately obtained history   -Ordering medications, tests, or procedures   -Documenting clinical information in the electronic or other health record     Thank you for the opportunity to participate in the care of  Deshawn Pena.    Assessment and Plan:    1. Snoring  I informed the patient that I do not have enough evidence to proceed directly with a sleep study at this point in time.  I will however order a nocturnal oximetry study look for abnormalities.  If positive I may consider proceeding with a sleep study.  The patient expressed understanding and agreed. He would prefer HST.  - Overnight oximetry study; Future    2. Circadian rhythm sleep disorder, shift work type  May need to address in more detail in the future. If the patient's nocturnal oximetry is negative, may consider continuing Nuvigil with annual blood pressure measurement.    History of present  illness:    He is a 36 year old male who comes to the virtual clinic with a chief complaint of shift worker syndrome. He was prescribed Nuvigil for shift working syndrome while living in [place]. This continued when he re-located to [place]. However his new PCP would like him to get evaluated to see if this is appropriate. The patient denies any episodes of witness apnea, snoring or excessive daytime sleepiness. He denies any sleep related headaches. His wife does mention that he does have some twitches of his limbs rarely during sleep. He admits that having a young child does adversely affect his quality and consistency of sleep. He would like to establish care to see if he can continue with the prescription for Nuvigil. He works as an ER Nurse. The patient does have a strong family history of DORIS with father and brother both diagnosed with DORIS.     Ideal Sleep-Wake Cycle(devoid of societal pressure):    Patient would try to initiate sleep at around 10:30-11 PM with a sleep latency of 5-10 minutes. The patient would have 0-1 awakenings. Final wake up time is around 7-7:30 AM.    Total score - Taylor: 2 (11/2/2021  4:14 PM)    Patient told to return in one week after the sleep study is interpreted.    Patient Active Problem List   Diagnosis     ACP (advance care planning)     Health Care Home     History reviewed. No pertinent past medical history.  History reviewed. No pertinent surgical history.  Current Outpatient Medications   Medication Sig Dispense Refill     armodafinil (NUVIGIL) 250 MG TABS tablet Take 1 tablet (250 mg) by mouth twice a week 30 tablet 1     ondansetron (ZOFRAN-ODT) 4 MG ODT tab Take 1 tablet (4 mg) by mouth every 6 hours as needed for nausea 60 tablet 1     Patient has no known allergies.  Social History     Socioeconomic History     Marital status:      Spouse name: Not on file     Number of children: Not on file     Years of education: Not on file     Highest education level: Not on file    Occupational History     Not on file   Tobacco Use     Smoking status: Never Smoker     Smokeless tobacco: Never Used   Substance and Sexual Activity     Alcohol use: Yes     Alcohol/week: 1.0 standard drinks     Types: 1 Shots of liquor per week     Drug use: Never     Sexual activity: Not on file   Other Topics Concern     Not on file   Social History Narrative     Not on file     Social Determinants of Health     Financial Resource Strain:      Difficulty of Paying Living Expenses:    Food Insecurity:      Worried About Running Out of Food in the Last Year:      Ran Out of Food in the Last Year:    Transportation Needs:      Lack of Transportation (Medical):      Lack of Transportation (Non-Medical):    Physical Activity:      Days of Exercise per Week:      Minutes of Exercise per Session:    Stress:      Feeling of Stress :    Social Connections:      Frequency of Communication with Friends and Family:      Frequency of Social Gatherings with Friends and Family:      Attends Zoroastrianism Services:      Active Member of Clubs or Organizations:      Attends Club or Organization Meetings:      Marital Status:    Intimate Partner Violence:      Fear of Current or Ex-Partner:      Emotionally Abused:      Physically Abused:      Sexually Abused:      Family History   Problem Relation Age of Onset     Hypertension Mother      Diabetes Type 2  Father      Hypertension Father      Obstructive Sleep Apnea Father      Obstructive Sleep Apnea Brother      Breast Cancer Maternal Grandmother      Substance Abuse Maternal Grandfather      Cerebrovascular Disease Paternal Grandmother      Heart Disease Paternal Grandmother         PFO     Coronary Artery Disease Paternal Grandfather         Physical Exam:  GEN: NAD,   Head: Normocephalic.  EYES: EOMI  ENT: Oropharynx is clear, Will class 4+ airway.   Psych: normal mood, normal affect  Snore test:(+)     Labs/Studies:     No results found for: PH, PHARTERIAL, PO2,  [id number], SAT, PCO2, HCO3, BASEEXCESS, AMENA, BEB  No results found for: TSH  Lab Results   Component Value Date    GLC 95 10/29/2021    GLC 92 09/02/2020     Lab Results   Component Value Date    HGB 14.3 09/02/2020     Lab Results   Component Value Date    BUN 14 09/02/2020    BUN 15.9 09/02/2020    CR 0.88 09/02/2020     No results found for: AST, ALT, GGT, ALKPHOS, BILITOTAL, BILICONJ, BILIDIRECT, KAITLIN  No results found for: UAMP, UBARB, BENZODIAZEUR, UCANN, UCOC, OPIT, UPCP    Recent Labs   Lab Test 10/29/21  1404 09/02/20  0000   NA  --  142.2   POTASSIUM  --  4.39   CHLORIDE  --  105.3   CO2  --  31.1   GLC 95 92   BUN  --  14  15.9   CR  --  0.88   MARGARITA  --  9.3       No results found for: JOHN Barragan DO  Board Certified in Internal Medicine and Sleep Medicine    (Note created with Dragon voice recognition and unintended spelling errors and word substitutions may occur)

## 2021-11-02 NOTE — PATIENT INSTRUCTIONS
Your BMI is Body mass index is 35.87 kg/m .  Weight management is a personal decision.  If you are interested in exploring weight loss strategies, the following discussion covers the approaches that may be successful. Body mass index (BMI) is one way to tell whether you are at a healthy weight, overweight, or obese. It measures your weight in relation to your height.  A BMI of 18.5 to 24.9 is in the healthy range. A person with a BMI of 25 to 29.9 is considered overweight, and someone with a BMI of 30 or greater is considered obese. More than two-thirds of American adults are considered overweight or obese.  Being overweight or obese increases the risk for further weight gain. Excess weight may lead to heart disease and diabetes.  Creating and following plans for healthy eating and physical activity may help you improve your health.  Weight control is part of healthy lifestyle and includes exercise, emotional health, and healthy eating habits. Careful eating habits lifelong are the mainstay of weight control. Though there are significant health benefits from weight loss, long-term weight loss with diet alone may be very difficult to achieve- studies show long-term success with dietary management in less than 10% of people. Attaining a healthy weight may be especially difficult to achieve in those with severe obesity. In some cases, medications, devices and surgical management might be considered.  What can you do?  If you are overweight or obese and are interested in methods for weight loss, you should discuss this with your provider.     Consider reducing daily calorie intake by 500 calories.     Keep a food journal.     Avoiding skipping meals, consider cutting portions instead.    Diet combined with exercise helps maintain muscle while optimizing fat loss. Strength training is particularly important for building and maintaining muscle mass. Exercise helps reduce stress, increase energy, and improves fitness.  Increasing exercise without diet control, however, may not burn enough calories to loose weight.       Start walking three days a week 10-20 minutes at a time    Work towards walking thirty minutes five days a week     Eventually, increase the speed of your walking for 1-2 minutes at time    In addition, we recommend that you review healthy lifestyles and methods for weight loss available through the National Institutes of Health patient information sites:  http://win.niddk.nih.gov/publications/index.htm    And look into health and wellness programs that may be available through your health insurance provider, employer, local community center, or german club.    Weight management plan: Patient was referred to their PCP to discuss a diet and exercise plan.  Contact information for [employer] company:    [employer] Tel: [phone]

## 2021-11-03 ENCOUNTER — VIRTUAL VISIT (OUTPATIENT)
Dept: SLEEP MEDICINE | Facility: CLINIC | Age: 36
End: 2021-11-03
Attending: STUDENT IN AN ORGANIZED HEALTH CARE EDUCATION/TRAINING PROGRAM
Payer: COMMERCIAL

## 2021-11-03 DIAGNOSIS — G47.26 CIRCADIAN RHYTHM SLEEP DISORDER, SHIFT WORK TYPE: ICD-10-CM

## 2021-11-03 DIAGNOSIS — R06.83 SNORING: Primary | ICD-10-CM

## 2021-11-03 PROCEDURE — 99203 OFFICE O/P NEW LOW 30 MIN: CPT | Mod: 95 | Performed by: INTERNAL MEDICINE

## 2021-11-09 ENCOUNTER — OFFICE VISIT (OUTPATIENT)
Dept: UROLOGY | Facility: CLINIC | Age: 36
End: 2021-11-09
Payer: COMMERCIAL

## 2021-11-09 DIAGNOSIS — Z30.2 ENCOUNTER FOR STERILIZATION: Primary | ICD-10-CM

## 2021-11-09 PROCEDURE — 88302 TISSUE EXAM BY PATHOLOGIST: CPT

## 2021-11-09 PROCEDURE — 55250 REMOVAL OF SPERM DUCT(S): CPT | Performed by: UROLOGY

## 2021-11-09 PROCEDURE — 99000 SPECIMEN HANDLING OFFICE-LAB: CPT | Performed by: UROLOGY

## 2021-11-11 LAB
PATH REPORT.COMMENTS IMP SPEC: NORMAL
PATH REPORT.COMMENTS IMP SPEC: NORMAL
PATH REPORT.FINAL DX SPEC: NORMAL
PATH REPORT.GROSS SPEC: NORMAL
PATH REPORT.MICROSCOPIC SPEC OTHER STN: NORMAL
PATH REPORT.RELEVANT HX SPEC: NORMAL
PHOTO IMAGE: NORMAL

## 2022-02-17 ENCOUNTER — OFFICE VISIT (OUTPATIENT)
Dept: URGENT CARE | Facility: URGENT CARE | Age: 37
End: 2022-02-17
Payer: COMMERCIAL

## 2022-02-17 VITALS
RESPIRATION RATE: 16 BRPM | OXYGEN SATURATION: 100 % | BODY MASS INDEX: 34.7 KG/M2 | WEIGHT: 285 LBS | HEART RATE: 86 BPM | HEIGHT: 76 IN | DIASTOLIC BLOOD PRESSURE: 87 MMHG | SYSTOLIC BLOOD PRESSURE: 132 MMHG | TEMPERATURE: 98.1 F

## 2022-02-17 DIAGNOSIS — L02.219 CELLULITIS AND ABSCESS OF TRUNK: Primary | ICD-10-CM

## 2022-02-17 DIAGNOSIS — L03.319 CELLULITIS AND ABSCESS OF TRUNK: Primary | ICD-10-CM

## 2022-02-17 PROCEDURE — 99213 OFFICE O/P EST LOW 20 MIN: CPT | Performed by: NURSE PRACTITIONER

## 2022-02-17 RX ORDER — CEPHALEXIN 500 MG/1
500 CAPSULE ORAL 2 TIMES DAILY
Qty: 20 CAPSULE | Refills: 0 | Status: SHIPPED | OUTPATIENT
Start: 2022-02-17 | End: 2022-02-27

## 2022-02-17 NOTE — PROGRESS NOTES
"Assessment & Plan     Cellulitis and abscess of trunk  - cephALEXin (KEFLEX) 500 MG capsule  Dispense: 20 capsule; Refill: 0     ddx  Includes pilonidal cyst vs cellulitis vs abscess.    Given small localized area Will treat for cellulitis  F/u with PCP if persists or worsens. May require US or I and D    Return in about 2 days (around 2/19/2022) for with regular provider if symptoms persist.    BLANQUITA Prieto CNP  [facility] [place] Bautista is a 36 year old male who presents to clinic today for the following health issues:  Chief Complaint   Patient presents with     Derm Problem     possible cellulitis; 3-4 days now, itchy, tender, feels hard under skin, fatigued, maybe an infection of some kind.     HPI    Had cellulitis on lower leg last year.    Feels hard to touch, swollen and tender over gluteal cleft.    Wife told him it is swollen.    Has not been red or hot.    No fevers    Review of Systems  Constitutional, HEENT, cardiovascular, pulmonary, GI, , musculoskeletal, neuro, skin, endocrine and psych systems are negative, except as otherwise noted.      Objective    /87   Pulse 86   Temp 98.1  F (36.7  C) (Oral)   Resp 16   Ht 1.918 m (6' 3.5\")   Wt 129.3 kg (285 lb)   SpO2 100%   BMI 35.15 kg/m    Physical Exam   GENERAL: healthy, alert and no distress  RESP: respirations unlabored  CV: pink and well perfused.    MS: no gross musculoskeletal defects noted, no edema  SKIN: pale pink indurated area approx 2 cm in diameter over medial gluteal cleft        "

## 2022-03-28 ENCOUNTER — ANCILLARY PROCEDURE (OUTPATIENT)
Dept: GENERAL RADIOLOGY | Facility: CLINIC | Age: 37
End: 2022-03-28
Attending: PHYSICIAN ASSISTANT
Payer: OTHER MISCELLANEOUS

## 2022-03-28 ENCOUNTER — OFFICE VISIT (OUTPATIENT)
Dept: URGENT CARE | Facility: URGENT CARE | Age: 37
End: 2022-03-28
Payer: OTHER MISCELLANEOUS

## 2022-03-28 VITALS
DIASTOLIC BLOOD PRESSURE: 83 MMHG | HEART RATE: 60 BPM | SYSTOLIC BLOOD PRESSURE: 128 MMHG | OXYGEN SATURATION: 99 % | TEMPERATURE: 97.5 F

## 2022-03-28 DIAGNOSIS — S69.91XA INJURY OF FINGER OF RIGHT HAND, INITIAL ENCOUNTER: ICD-10-CM

## 2022-03-28 DIAGNOSIS — S69.91XA INJURY OF FINGER OF RIGHT HAND, INITIAL ENCOUNTER: Primary | ICD-10-CM

## 2022-03-28 PROCEDURE — 73140 X-RAY EXAM OF FINGER(S): CPT | Mod: RT | Performed by: RADIOLOGY

## 2022-03-28 PROCEDURE — 99213 OFFICE O/P EST LOW 20 MIN: CPT | Performed by: PHYSICIAN ASSISTANT

## 2022-03-28 NOTE — LETTER
[facility]  [address] [facility] [address]  [phone]        2022    REPORT OF WORK ABILITY    PATIENT DATA  Employee Name: Deshawn Pena        : 1985   [id number]  Work related injury: YES  Today's date: 2022  Date of injury: 3/28/2022     PROVIDER EVALUATION: Please fill in as needed.  Please give copy to employee for employer.  1. Diagnosis: Sprain/strain of right index finger  2. Treatment: rest, ice, compression, elevation  3. Medication: Ibuprofen or Tylenol  NOTE: When ordering a medication, [place] Rules require Work Comp or WC on prescriptions.  4. Return to work date: May return today with the following: * Restricted lifting - Maximum lift in pounds: 10  * Restricted use of hand: Right - Limited repetitive use-grasping, gripping. . DURATION OF LIMITATIONS: 22      RESTRICTIONS: Unlimited unless listed.  Restrictions apply to home and leisure also.  If work within restrictions is not available, the employee is totally disabled.  Medical Examiner: Samia Coleman PA-C       Next appointment: 4 days with family practice as needed    CC: Employer, Managed Care Plan/Payor, Patient
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
## 2022-03-29 NOTE — PATIENT INSTRUCTIONS
Patient was educated on the natural course of injury.  No acute fractures or dislocation seen on x-ray.  Conservative measures discussed including rest, ice, compression, elevation, and over-the-counter analgesics (Tylenol or Ibuprofen) as needed. See your primary care provider if symptoms worsen or do not improve in 4 days. Placed on work restrictions. Seek emergency care if you develop severe pain/swelling, inability to move extremity, skin paleness, or weakness.

## 2022-03-31 ENCOUNTER — OFFICE VISIT (OUTPATIENT)
Dept: FAMILY MEDICINE | Facility: CLINIC | Age: 37
End: 2022-03-31

## 2022-03-31 VITALS
BODY MASS INDEX: 35.29 KG/M2 | DIASTOLIC BLOOD PRESSURE: 82 MMHG | TEMPERATURE: 98.1 F | SYSTOLIC BLOOD PRESSURE: 128 MMHG | HEIGHT: 76 IN | OXYGEN SATURATION: 96 % | HEART RATE: 66 BPM | WEIGHT: 289.8 LBS

## 2022-03-31 DIAGNOSIS — S69.91XD INJURY OF RIGHT HAND, SUBSEQUENT ENCOUNTER: Primary | ICD-10-CM

## 2022-03-31 PROCEDURE — 99213 OFFICE O/P EST LOW 20 MIN: CPT | Performed by: PHYSICIAN ASSISTANT
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
## 2022-03-31 NOTE — NURSING NOTE
Chief Complaint   Patient presents with     Work Comp     [facility] [place] finger injury on 03/28/22

## 2022-04-16 DIAGNOSIS — G47.26 SHIFT WORK SLEEP DISORDER: ICD-10-CM

## 2022-04-18 RX ORDER — ARMODAFINIL 250 MG/1
250 TABLET ORAL
Qty: 30 TABLET | Refills: 0 | Status: SHIPPED | OUTPATIENT
Start: 2022-04-18

## 2022-04-18 NOTE — TELEPHONE ENCOUNTER
Refill approved but please notify pt needs to complete sleep testing as recommended in November prior to further refills. Micheal Calderon MD on 4/18/2022 at 10:20 AM

## 2022-04-18 NOTE — TELEPHONE ENCOUNTER
Please advise. Pt last seen 10/29/21.    Deshawn Pena is requesting a refill of:    Pending Prescriptions:                       Disp   Refills    armodafinil (NUVIGIL) 250 MG TABS tablet *30 tab*1            Sig: TAKE 1 TABLET (250 MG) BY MOUTH TWICE A WEEK

## 2022-08-03 NOTE — PATIENT INSTRUCTIONS
Patient Education     Discharge Instructions for Cellulitis   You have been diagnosed with cellulitis. This is an infection in the deepest layer of the skin and tissue beneath the skin. In some cases, the infection also affects the muscle. Cellulitis is caused by bacteria. The bacteria can enter the body through broken skin. This can happen with a cut, scratch, animal bite, or an insect bite that has been scratched. You may have been treated in the hospital with antibiotics and fluids. You will likely be given a prescription for antibiotics to take at home. This sheet will help you take care of yourself at home.  Home care  When you are home:    Take the prescribed antibiotic medicine you are given as directed until it is gone. Take it even if you feel better. It treats the infection and stops it from returning. Not taking all the medicine can make future infections hard to treat.    Keep the infected area clean.    When possible, raise the infected area above the level of your heart. This helps keep swelling down.    Talk with your healthcare provider if you are in pain. Ask what kind of over-the-counter medicine you can take for pain.    Apply clean bandages as advised.    Take your temperature once a day for a week.    Wash your hands often to prevent spreading the infection.  In the future, wash your hands before and after you touch cuts, scratches, or bandages. This will help prevent infection.   When to call your healthcare provider  Call your healthcare provider right away if you have any of the following:    Trouble or pain when moving the joints above or below the infected area    Discharge or pus draining from the area    Fever of 100.4 F (38 C) or higher, or as directed by your healthcare provider    Pain that gets worse in or around the infected     Redness that gets worse in or around the infected area, particularly if the area of redness expands to a wider area    Shaking chills    Swelling of the  infected area    Vomiting  Jessee last reviewed this educational content on 11/1/2019 2000-2021 [employer]. All rights reserved. This information is not intended as a substitute for professional medical care. Always follow your healthcare professional's instructions.            2 seconds or less

## 2022-09-18 ENCOUNTER — HEALTH MAINTENANCE LETTER (OUTPATIENT)
Age: 37
End: 2022-09-18

## 2023-01-29 ENCOUNTER — HEALTH MAINTENANCE LETTER (OUTPATIENT)
Age: 38
End: 2023-01-29

## 2024-02-25 ENCOUNTER — HEALTH MAINTENANCE LETTER (OUTPATIENT)
Age: 39
End: 2024-02-25

## 2024-06-17 PROBLEM — Z76.89 HEALTH CARE HOME: Status: RESOLVED | Noted: 2020-09-01 | Resolved: 2024-06-17

## 2025-03-15 ENCOUNTER — HEALTH MAINTENANCE LETTER (OUTPATIENT)
Age: 40
End: 2025-03-15